# Patient Record
Sex: MALE | Race: OTHER | HISPANIC OR LATINO | URBAN - METROPOLITAN AREA
[De-identification: names, ages, dates, MRNs, and addresses within clinical notes are randomized per-mention and may not be internally consistent; named-entity substitution may affect disease eponyms.]

---

## 2023-08-15 ENCOUNTER — INPATIENT (INPATIENT)
Facility: HOSPITAL | Age: 28
LOS: 0 days | Discharge: HOME | DRG: 563 | End: 2023-08-16
Attending: STUDENT IN AN ORGANIZED HEALTH CARE EDUCATION/TRAINING PROGRAM | Admitting: INTERNAL MEDICINE
Payer: COMMERCIAL

## 2023-08-15 VITALS
SYSTOLIC BLOOD PRESSURE: 121 MMHG | DIASTOLIC BLOOD PRESSURE: 76 MMHG | HEIGHT: 71 IN | WEIGHT: 145.06 LBS | TEMPERATURE: 99 F | RESPIRATION RATE: 18 BRPM | OXYGEN SATURATION: 97 % | HEART RATE: 88 BPM

## 2023-08-15 DIAGNOSIS — Z90.49 ACQUIRED ABSENCE OF OTHER SPECIFIED PARTS OF DIGESTIVE TRACT: Chronic | ICD-10-CM

## 2023-08-15 LAB
ANION GAP SERPL CALC-SCNC: 7 MMOL/L — SIGNIFICANT CHANGE UP (ref 5–17)
BASOPHILS # BLD AUTO: 0.01 K/UL — SIGNIFICANT CHANGE UP (ref 0–0.2)
BASOPHILS NFR BLD AUTO: 0.1 % — SIGNIFICANT CHANGE UP (ref 0–2)
BUN SERPL-MCNC: 13 MG/DL — SIGNIFICANT CHANGE UP (ref 7–23)
CALCIUM SERPL-MCNC: 9.7 MG/DL — SIGNIFICANT CHANGE UP (ref 8.4–10.5)
CHLORIDE SERPL-SCNC: 100 MMOL/L — SIGNIFICANT CHANGE UP (ref 96–108)
CO2 SERPL-SCNC: 30 MMOL/L — SIGNIFICANT CHANGE UP (ref 22–31)
CREAT SERPL-MCNC: 1.03 MG/DL — SIGNIFICANT CHANGE UP (ref 0.5–1.3)
EGFR: 101 ML/MIN/1.73M2 — SIGNIFICANT CHANGE UP
EOSINOPHIL # BLD AUTO: 0.02 K/UL — SIGNIFICANT CHANGE UP (ref 0–0.5)
EOSINOPHIL NFR BLD AUTO: 0.2 % — SIGNIFICANT CHANGE UP (ref 0–6)
GLUCOSE SERPL-MCNC: 164 MG/DL — HIGH (ref 70–99)
HCT VFR BLD CALC: 41.1 % — SIGNIFICANT CHANGE UP (ref 39–50)
HGB BLD-MCNC: 15 G/DL — SIGNIFICANT CHANGE UP (ref 13–17)
IMM GRANULOCYTES NFR BLD AUTO: 0.2 % — SIGNIFICANT CHANGE UP (ref 0–0.9)
LYMPHOCYTES # BLD AUTO: 2.84 K/UL — SIGNIFICANT CHANGE UP (ref 1–3.3)
LYMPHOCYTES # BLD AUTO: 30.3 % — SIGNIFICANT CHANGE UP (ref 13–44)
MCHC RBC-ENTMCNC: 28.8 PG — SIGNIFICANT CHANGE UP (ref 27–34)
MCHC RBC-ENTMCNC: 36.5 GM/DL — HIGH (ref 32–36)
MCV RBC AUTO: 78.9 FL — LOW (ref 80–100)
MONOCYTES # BLD AUTO: 0.54 K/UL — SIGNIFICANT CHANGE UP (ref 0–0.9)
MONOCYTES NFR BLD AUTO: 5.8 % — SIGNIFICANT CHANGE UP (ref 2–14)
NEUTROPHILS # BLD AUTO: 5.94 K/UL — SIGNIFICANT CHANGE UP (ref 1.8–7.4)
NEUTROPHILS NFR BLD AUTO: 63.4 % — SIGNIFICANT CHANGE UP (ref 43–77)
NRBC # BLD: 0 /100 WBCS — SIGNIFICANT CHANGE UP (ref 0–0)
PLATELET # BLD AUTO: 241 K/UL — SIGNIFICANT CHANGE UP (ref 150–400)
POTASSIUM SERPL-MCNC: 3.8 MMOL/L — SIGNIFICANT CHANGE UP (ref 3.5–5.3)
POTASSIUM SERPL-SCNC: 3.8 MMOL/L — SIGNIFICANT CHANGE UP (ref 3.5–5.3)
RBC # BLD: 5.21 M/UL — SIGNIFICANT CHANGE UP (ref 4.2–5.8)
RBC # FLD: 12.4 % — SIGNIFICANT CHANGE UP (ref 10.3–14.5)
SODIUM SERPL-SCNC: 137 MMOL/L — SIGNIFICANT CHANGE UP (ref 135–145)
WBC # BLD: 9.37 K/UL — SIGNIFICANT CHANGE UP (ref 3.8–10.5)
WBC # FLD AUTO: 9.37 K/UL — SIGNIFICANT CHANGE UP (ref 3.8–10.5)

## 2023-08-15 PROCEDURE — 99223 1ST HOSP IP/OBS HIGH 75: CPT | Mod: GC

## 2023-08-15 PROCEDURE — 72100 X-RAY EXAM L-S SPINE 2/3 VWS: CPT | Mod: 26

## 2023-08-15 PROCEDURE — 99285 EMERGENCY DEPT VISIT HI MDM: CPT

## 2023-08-15 PROCEDURE — 72170 X-RAY EXAM OF PELVIS: CPT | Mod: 26

## 2023-08-15 RX ORDER — LIDOCAINE 4 G/100G
1 CREAM TOPICAL ONCE
Refills: 0 | Status: COMPLETED | OUTPATIENT
Start: 2023-08-15 | End: 2023-08-15

## 2023-08-15 RX ORDER — OXYCODONE HYDROCHLORIDE 5 MG/1
5 TABLET ORAL ONCE
Refills: 0 | Status: DISCONTINUED | OUTPATIENT
Start: 2023-08-15 | End: 2023-08-15

## 2023-08-15 RX ORDER — ACETAMINOPHEN 500 MG
975 TABLET ORAL ONCE
Refills: 0 | Status: COMPLETED | OUTPATIENT
Start: 2023-08-15 | End: 2023-08-15

## 2023-08-15 RX ORDER — DIAZEPAM 5 MG
5 TABLET ORAL ONCE
Refills: 0 | Status: DISCONTINUED | OUTPATIENT
Start: 2023-08-15 | End: 2023-08-15

## 2023-08-15 RX ORDER — ACETAMINOPHEN 500 MG
650 TABLET ORAL EVERY 6 HOURS
Refills: 0 | Status: DISCONTINUED | OUTPATIENT
Start: 2023-08-15 | End: 2023-08-16

## 2023-08-15 RX ORDER — KETOROLAC TROMETHAMINE 30 MG/ML
30 SYRINGE (ML) INJECTION ONCE
Refills: 0 | Status: DISCONTINUED | OUTPATIENT
Start: 2023-08-15 | End: 2023-08-15

## 2023-08-15 RX ORDER — ONDANSETRON 8 MG/1
4 TABLET, FILM COATED ORAL EVERY 8 HOURS
Refills: 0 | Status: DISCONTINUED | OUTPATIENT
Start: 2023-08-15 | End: 2023-08-16

## 2023-08-15 RX ORDER — LANOLIN ALCOHOL/MO/W.PET/CERES
3 CREAM (GRAM) TOPICAL AT BEDTIME
Refills: 0 | Status: DISCONTINUED | OUTPATIENT
Start: 2023-08-15 | End: 2023-08-16

## 2023-08-15 RX ADMIN — OXYCODONE HYDROCHLORIDE 5 MILLIGRAM(S): 5 TABLET ORAL at 18:55

## 2023-08-15 RX ADMIN — LIDOCAINE 1 PATCH: 4 CREAM TOPICAL at 19:42

## 2023-08-15 RX ADMIN — Medication 5 MILLIGRAM(S): at 15:49

## 2023-08-15 RX ADMIN — Medication 975 MILLIGRAM(S): at 15:49

## 2023-08-15 RX ADMIN — LIDOCAINE 1 PATCH: 4 CREAM TOPICAL at 15:48

## 2023-08-15 RX ADMIN — Medication 975 MILLIGRAM(S): at 16:49

## 2023-08-15 RX ADMIN — Medication 30 MILLIGRAM(S): at 15:50

## 2023-08-15 RX ADMIN — OXYCODONE HYDROCHLORIDE 5 MILLIGRAM(S): 5 TABLET ORAL at 19:55

## 2023-08-15 RX ADMIN — LIDOCAINE 1 PATCH: 4 CREAM TOPICAL at 18:55

## 2023-08-15 RX ADMIN — Medication 30 MILLIGRAM(S): at 16:20

## 2023-08-15 RX ADMIN — LIDOCAINE 1 PATCH: 4 CREAM TOPICAL at 19:19

## 2023-08-15 NOTE — ED PROVIDER NOTE - PHYSICAL EXAMINATION
Gen - Nontoxic appearing, follows all commands, comfortable at rest; A+Ox3   HEENT - NCAT, EOMI, moist mucous membranes, clear oropharynx  Neck - supple  Resp - CTAB, no increased WOB  CV -  RRR, no m/r/g  Abd - soft, NT, ND; no guarding or rebound  Back - no midline tenderness/crepitus/stepoffs; +paraspinal lumbar region tenderness and pain reproduced with back rotation/flexion  MSK - FROM of b/l UE and LE, no gross deformities  Extrem - no LE edema/erythema/tenderness  Neuro - full motor strength and sensation to LT throughout  Skin - warm, well perfused

## 2023-08-15 NOTE — ED PROVIDER NOTE - PROGRESS NOTE DETAILS
Jelani Conley MD: Patient reassessed multiple times--despite multimodal pain control as well as oxycodone dose, patient having significant pain to lower back, is able to bear weight and walk small distances slowly but is very apprehensive towards ambulation and does not feel safe going home. Patient was able to urinate in bathroom without issues, continues to be neurologically intact on exam, and has very much muscular tenderness/pain with movement to paraspinal lumbar back. Will at this point admit patient for pain control and PT/OT eval. L-spine XR read by radiology, negative for acute fracture/dislocation, do not feel patient would benefit from further imaging.

## 2023-08-15 NOTE — H&P ADULT - ATTENDING COMMENTS
#Lower back pain: p/w acute back pain during gym work out; +tender to palpation to left lumbar area, neg straight leg test, MS/Sensation intact. Low suspicion for spinal etiology. Likley muscle strain related. c/w pain control, flexiril prn, consider further imaging if not improved

## 2023-08-15 NOTE — H&P ADULT - PROBLEM SELECTOR PLAN 1
P/w low back pain s/p gym injury. Was on his back raising both legs, when he felt a sudden pull in his lower back region that is now severely painful especially with movement, having trouble walking due to pain. No alarm signs. XR lumbar unremarkable. s/p Tylenol 975mg, Toradol 30mg, Oxycodone 5mg, Valium 5mg & 2x Lidocaine patch 4%.  - Reevaluate pain in AM, if worsening consider additional imaging (CT/MRI spine)  - Pain regimen with PRN PO Tylenol 650mg, Oxycodone 5/10mg q6h for mild, mod, severe pain  - PT eval P/w low back pain s/p gym injury. Was on his back raising both legs, when he felt a sudden pull in his lower back region that is now severely painful especially with movement, having trouble walking due to pain. No alarm signs. XR lumbar unremarkable. s/p Tylenol 975mg, Toradol 30mg, Oxycodone 5mg, Valium 5mg & 2x Lidocaine patch 4%.  - Reevaluate pain in AM, if worsening consider additional imaging (CT/MRI spine)  - Pain regimen with PRN PO Tylenol 650mg, Cyclobenzaprine 5mg q8h, Oxycodone 5mg q6h for mild, mod, severe pain  - PT eval

## 2023-08-15 NOTE — H&P ADULT - HISTORY OF PRESENT ILLNESS
28M hx of appendectomy, presenting with low back pain s/p gym injury. States he was working out at the gym, was on his back raising both legs, when he felt a sudden pull in his lower back region that is now severely painful especially with movement, having trouble walking due to pain. Denies any other form of trauma/fall. No numbness, focal weakness, saddle anesthesia, urinary/fecal incontinence. Feels like previous similar episode of muscular strain that he sustained while also lifting at the gym. NKDA.    In the ED, VSS, labs largely pristine. XR lumbar unremarkable. He received Po Tylenol 975mg, Toradol 30mg, Oxycodone 5mg, Valium 5mg & 2x Lidocaine patch 4%.

## 2023-08-15 NOTE — ED ADULT NURSE NOTE - CHIEF COMPLAINT QUOTE
pt bibems from [employer] c/o "pulling a muscle in the back". Pt states he had a similar injury recently and lifting at the gym today made the pain worse. Pt states the pain is worse with position change. Denies any numbness weakness, incontinence, SOB, or CP.

## 2023-08-15 NOTE — H&P ADULT - NSHPLABSRESULTS_GEN_ALL_CORE
LABS:                         15.0   9.37  )-----------( 241      ( 15 Aug 2023 20:39 )             41.1     08-15    137  |  100  |  13  ----------------------------<  164<H>  3.8   |  30  |  1.03    Ca    9.7      15 Aug 2023 20:39    Urinalysis Basic - ( 15 Aug 2023 20:39 )    Color: x / Appearance: x / SG: x / pH: x  Gluc: 164 mg/dL / Ketone: x  / Bili: x / Urobili: x   Blood: x / Protein: x / Nitrite: x   Leuk Esterase: x / RBC: x / WBC x   Sq Epi: x / Non Sq Epi: x / Bacteria: x    RADIOLOGY, EKG & ADDITIONAL TESTS:

## 2023-08-15 NOTE — ED PROVIDER NOTE - OBJECTIVE STATEMENT
28 year old male, hx of appendectomy, presenting with low back pain s/p gym injury. States he was working out at the gym, was on his back raising both legs, when he felt a sudden pull in his lower back region that is now severely painful especially with movement, having trouble walking due to pain. Denies any other form of trauma/fall. No numbness, focal weakness, saddle anesthesia, urinary/fecal incontinence. Feels like previous similar episode of muscular strain that he sustained while also lifting at the gym. NKDA.

## 2023-08-15 NOTE — H&P ADULT - NSHPPHYSICALEXAM_GEN_ALL_CORE
.  VITAL SIGNS:  T(C): 36.6 (08-15-23 @ 23:35), Max: 37.1 (08-15-23 @ 15:02)  T(F): 97.9 (08-15-23 @ 23:35), Max: 98.8 (08-15-23 @ 15:02)  HR: 71 (08-15-23 @ 23:35) (71 - 88)  BP: 138/72 (08-15-23 @ 23:35) (112/67 - 138/72)  BP(mean): 94 (08-15-23 @ 23:35) (94 - 94)  RR: 17 (08-15-23 @ 23:35) (16 - 18)  SpO2: 95% (08-15-23 @ 23:35) (95% - 99%)  Wt(kg): --    PHYSICAL EXAM:    Constitutional: WDWN resting comfortably in bed; NAD  Head: NC/AT  Eyes: PERRL, EOMI, clear conjunctiva  ENT: no nasal discharge; uvula midline, no oropharyngeal erythema or exudates; MMM  Neck: supple; no JVD or thyromegaly  Respiratory: CTA B/L; no W/R/R, no retractions  Cardiac: +S1/S2; RRR; no M/R/G;  Gastrointestinal: soft, NT/ND; no rebound or guarding; +BSx4  Extremities: WWP, no clubbing or cyanosis; no peripheral edema  Musculoskeletal: NROM x4; no joint swelling, tenderness or erythema. L hip not TTP. Straight leg test L more painful than right.  Vascular: 2+ radial, femoral, DP/PT pulses B/L  Dermatologic: skin warm, dry and intact; no rashes, wounds, or scars  Neurologic: AAOx3; CNII-XII grossly intact; no focal deficits  Psychiatric: affect and characteristics of appearance, verbalizations, behaviors are appropriate VITAL SIGNS:  T(C): 36.6 (08-15-23 @ 23:35), Max: 37.1 (08-15-23 @ 15:02)  T(F): 97.9 (08-15-23 @ 23:35), Max: 98.8 (08-15-23 @ 15:02)  HR: 71 (08-15-23 @ 23:35) (71 - 88)  BP: 138/72 (08-15-23 @ 23:35) (112/67 - 138/72)  BP(mean): 94 (08-15-23 @ 23:35) (94 - 94)  RR: 17 (08-15-23 @ 23:35) (16 - 18)  SpO2: 95% (08-15-23 @ 23:35) (95% - 99%)  Wt(kg): --    PHYSICAL EXAM:  Constitutional: resting comfortably in bed; NAD  Head: NC/AT  Eyes: PERRL, EOMI, clear conjunctiva  ENT: no nasal discharge; uvula midline, no oropharyngeal erythema or exudates; MMM  Neck: supple; no JVD or thyromegaly  Respiratory: CTA B/L; no W/R/R, no retractions  Cardiac: +S1/S2; RRR; no M/R/G;  Gastrointestinal: soft, NT/ND; no rebound or guarding; +BSx4  Extremities: WWP, no clubbing or cyanosis; no peripheral edema  Musculoskeletal: NROM x4; no joint swelling, tenderness or erythema. L hip not TTP. Negative straight leg test. mild L gluteal pain on hip flexion  Vascular: 2+ radial, femoral, DP/PT pulses B/L  Dermatologic: skin warm, dry and intact; no rashes, wounds, or scars  Neurologic: AAOx3; CNII-XII grossly intact; no focal deficits  Psychiatric: affect and characteristics of appearance, verbalizations, behaviors are appropriate

## 2023-08-15 NOTE — ED PROVIDER NOTE - CLINICAL SUMMARY MEDICAL DECISION MAKING FREE TEXT BOX
28 year old male, hx of appendectomy, presenting with low back pain s/p gym injury. Nontoxic here, neurologically intact, no red flags for acute cord compression, overall low mechanism of injury. Very likely muscular strain vs spasm, no bony tenderness on exam, but per shared decision making, will obtain XR pelvis/L spine to r/o acute bony fracture. Likely DC with symptomatic treatment if improved and able to ambulate.

## 2023-08-15 NOTE — H&P ADULT - PROBLEM SELECTOR PLAN 2
F: None   E: Replete as necessary K>4 Mg>2  N: Regular diet     DVT Prophylaxis: None  GI prophylaxis: None   CODE STATUS: FULL

## 2023-08-15 NOTE — ED ADULT TRIAGE NOTE - CHIEF COMPLAINT QUOTE
pt bibems from "[employer] c/o "pulling a muscle in the back". Pt states he had a similar injury recently and lifting at the gym today made the pain worse. Pt states the pain is worse with position change. Denies any numbness weakness, incontinence, SOB, or CP.

## 2023-08-16 VITALS
SYSTOLIC BLOOD PRESSURE: 127 MMHG | HEART RATE: 70 BPM | OXYGEN SATURATION: 96 % | RESPIRATION RATE: 17 BRPM | TEMPERATURE: 99 F | DIASTOLIC BLOOD PRESSURE: 69 MMHG

## 2023-08-16 DIAGNOSIS — M54.9 DORSALGIA, UNSPECIFIED: ICD-10-CM

## 2023-08-16 DIAGNOSIS — Z00.00 ENCOUNTER FOR GENERAL ADULT MEDICAL EXAMINATION WITHOUT ABNORMAL FINDINGS: ICD-10-CM

## 2023-08-16 PROCEDURE — 99285 EMERGENCY DEPT VISIT HI MDM: CPT

## 2023-08-16 PROCEDURE — 36415 COLL VENOUS BLD VENIPUNCTURE: CPT

## 2023-08-16 PROCEDURE — 80048 BASIC METABOLIC PNL TOTAL CA: CPT

## 2023-08-16 PROCEDURE — G0378: CPT

## 2023-08-16 PROCEDURE — 85025 COMPLETE CBC W/AUTO DIFF WBC: CPT

## 2023-08-16 PROCEDURE — 99239 HOSP IP/OBS DSCHRG MGMT >30: CPT | Mod: GC

## 2023-08-16 PROCEDURE — 97161 PT EVAL LOW COMPLEX 20 MIN: CPT

## 2023-08-16 PROCEDURE — 72100 X-RAY EXAM L-S SPINE 2/3 VWS: CPT

## 2023-08-16 PROCEDURE — 72170 X-RAY EXAM OF PELVIS: CPT

## 2023-08-16 PROCEDURE — 96372 THER/PROPH/DIAG INJ SC/IM: CPT

## 2023-08-16 RX ORDER — CYCLOBENZAPRINE HYDROCHLORIDE 10 MG/1
1 TABLET, FILM COATED ORAL
Qty: 10 | Refills: 0
Start: 2023-08-16

## 2023-08-16 RX ORDER — CYCLOBENZAPRINE HYDROCHLORIDE 10 MG/1
1 TABLET, FILM COATED ORAL
Qty: 30 | Refills: 0
Start: 2023-08-16 | End: 2023-08-25

## 2023-08-16 RX ORDER — OXYCODONE HYDROCHLORIDE 5 MG/1
5 TABLET ORAL EVERY 6 HOURS
Refills: 0 | Status: DISCONTINUED | OUTPATIENT
Start: 2023-08-16 | End: 2023-08-16

## 2023-08-16 RX ORDER — IBUPROFEN 200 MG
1 TABLET ORAL
Qty: 20 | Refills: 0
Start: 2023-08-16 | End: 2023-08-20

## 2023-08-16 RX ORDER — OXYCODONE HYDROCHLORIDE 5 MG/1
10 TABLET ORAL EVERY 6 HOURS
Refills: 0 | Status: DISCONTINUED | OUTPATIENT
Start: 2023-08-16 | End: 2023-08-16

## 2023-08-16 RX ORDER — CYCLOBENZAPRINE HYDROCHLORIDE 10 MG/1
5 TABLET, FILM COATED ORAL EVERY 8 HOURS
Refills: 0 | Status: DISCONTINUED | OUTPATIENT
Start: 2023-08-16 | End: 2023-08-16

## 2023-08-16 RX ORDER — CYCLOBENZAPRINE HYDROCHLORIDE 10 MG/1
5 TABLET, FILM COATED ORAL THREE TIMES A DAY
Refills: 0 | Status: DISCONTINUED | OUTPATIENT
Start: 2023-08-16 | End: 2023-08-16

## 2023-08-16 RX ORDER — ACETAMINOPHEN 500 MG
2 TABLET ORAL
Qty: 40 | Refills: 0
Start: 2023-08-16 | End: 2023-08-20

## 2023-08-16 RX ORDER — LIDOCAINE 4 G/100G
1 CREAM TOPICAL ONCE
Refills: 0 | Status: COMPLETED | OUTPATIENT
Start: 2023-08-16 | End: 2023-08-16

## 2023-08-16 RX ADMIN — LIDOCAINE 1 PATCH: 4 CREAM TOPICAL at 03:17

## 2023-08-16 RX ADMIN — Medication 650 MILLIGRAM(S): at 13:55

## 2023-08-16 RX ADMIN — OXYCODONE HYDROCHLORIDE 10 MILLIGRAM(S): 5 TABLET ORAL at 04:55

## 2023-08-16 RX ADMIN — OXYCODONE HYDROCHLORIDE 10 MILLIGRAM(S): 5 TABLET ORAL at 03:55

## 2023-08-16 RX ADMIN — Medication 650 MILLIGRAM(S): at 13:04

## 2023-08-16 RX ADMIN — LIDOCAINE 1 PATCH: 4 CREAM TOPICAL at 07:41

## 2023-08-16 NOTE — PROGRESS NOTE ADULT - PROBLEM SELECTOR PLAN 1
P/w low back pain s/p gym injury. Was on his back raising both legs, when he felt a sudden pull in his lower back region that is now severely painful especially with movement, having trouble walking due to pain. No alarm signs. XR lumbar unremarkable. s/p Tylenol 975mg, Toradol 30mg, Oxycodone 5mg, Valium 5mg & 2x Lidocaine patch 4%.  - Reevaluate pain in AM, if worsening consider additional imaging (CT/MRI spine)  - Pain regimen with PRN PO Tylenol 650mg, Cyclobenzaprine 5mg q8h, Oxycodone 5mg q6h for mild, mod, severe pain  - PT eval P/w low back pain s/p gym injury. Was on his back raising both legs, when he felt a sudden pull in his lower back region that is now severely painful especially with movement, having trouble walking due to pain. No alarm signs. XR lumbar unremarkable. s/p Tylenol 975mg, Toradol 30mg, Oxycodone 5mg, Valium 5mg & 2x Lidocaine patch 4%.  - Reevaluate pain in AM, if worsening consider additional imaging (CT/MRI spine) -- patient pain improved AM ,no additional imaging  - Pain regimen with PRN PO Tylenol 650mg, lidocaine patch, Cyclobenzaprine 5mg q8h for mild, mod, severe pain  -d/c PRN oxycodone  - PT eval

## 2023-08-16 NOTE — PROGRESS NOTE ADULT - SUBJECTIVE AND OBJECTIVE BOX
***INCOMPLETE*** ***INCOMPLETE***    29yo man with no significant medical history presenting to the ED with severe lower back pain after gym injury. Yesterday while working out he felt a pull in his back, worse with movement and limiting his ability to walk.    ED course: Patient found to have paraspinal tenderness to palpation and movement. Pain persisted through multimodal therapy: given acetaminophen, ketorolac, lidocaine patch, oxycodone, valium. Lumbar spine x-ray negative for fracture or dislocation. Patient able to tolerate weight and ambulate with difficulty, stated that does not feel safe to go home.    Overnight:  Last PRN oxycodone 10mg at 4am. Otherwise no events.  Interval/ROS: Patient seen at bedside, states "I feel okay to go home." Patient endorses nausea without vomiting after drinking water, lumbar back pain with movement 6/10 wrapping around abdomen to front bilaterally, mild dizziness with ambulation. He denies headache, fever, chills, SOB, chest pain, abdominal pain, dysuria, weakness, myalgias, constipation/diarrhea, saddle anesthesia, incontinence, paresthesias.    OBJECTIVE:    Vital Signs Last 24 Hrs  T(C): 36.7 (16 Aug 2023 06:22), Max: 37.1 (15 Aug 2023 15:02)  T(F): 98.1 (16 Aug 2023 06:22), Max: 98.8 (15 Aug 2023 15:02)  HR: 67 (16 Aug 2023 06:22) (67 - 88)  BP: 122/64 (16 Aug 2023 06:22) (112/67 - 138/72)  BP(mean): 94 (15 Aug 2023 23:35) (94 - 94)  RR: 18 (16 Aug 2023 06:22) (16 - 18)  SpO2: 97% (16 Aug 2023 06:22) (95% - 99%)    Parameters below as of 16 Aug 2023 06:22  Patient On (Oxygen Delivery Method): room air      CONSTITUTIONAL: No apparent distress, resting comfortably  EYES: Pupils symmetric, EOMI, No conjunctival or scleral injection, non-icteric  ENMT: Oral mucosa with moist membranes  RESP: No respiratory distress, no use of accessory muscles; CTA b/l, no crackles or wheezes  CV: RRR, +S1S2, no murmurs appreciated; no peripheral edema  GI: NT, Soft, ND, no rebound, no guarding  MSK: No pain elicited to palpation of spine or paraspinal region. Gait with narrow base, small steps, effortful, able to ambulate to bathroom without assistance. Hip flex against resistance b/l elicited mild pain. Leg extension elicited no pain.  : deferred  SKIN: No rashes or ulcers noted  NEURO: CN II-XII grossly intact  PSYCH: Appropriate insight/judgment; A+O x 3, mood and affect appropriate, recent/remote memory intact        Allergies    No Known Allergies    Intolerances        MEDICATIONS  (STANDING): none    MEDICATIONS  (PRN):  acetaminophen     Tablet .. 650 milliGRAM(s) Oral every 6 hours PRN Temp greater or equal to 38C (100.4F), Mild Pain (1 - 3)  aluminum hydroxide/magnesium hydroxide/simethicone Suspension 30 milliLiter(s) Oral every 4 hours PRN Dyspepsia  cyclobenzaprine 5 milliGRAM(s) Oral three times a day PRN Muscle Spasm  melatonin 3 milliGRAM(s) Oral at bedtime PRN Insomnia  ondansetron Injectable 4 milliGRAM(s) IV Push every 8 hours PRN Nausea and/or Vomiting  oxyCODONE    IR 5 milliGRAM(s) Oral every 6 hours PRN Severe Pain (7 - 10)                                       15.0   9.37  )-----------( 241      ( 15 Aug 2023 20:39 )             41.1         08-15    137  |  100  |  13  ----------------------------<  164<H>  3.8   |  30  |  1.03    Ca    9.7      15 Aug 2023 20:39          Urinalysis Basic - ( 15 Aug 2023 20:39 )    Color: x / Appearance: x / SG: x / pH: x  Gluc: 164 mg/dL / Ketone: x  / Bili: x / Urobili: x   Blood: x / Protein: x / Nitrite: x   Leuk Esterase: x / RBC: x / WBC x   Sq Epi: x / Non Sq Epi: x / Bacteria: x   Internal Medicine Progress Note    29yo man with no significant medical history presenting to the ED with severe lower back pain after gym injury. Yesterday while working out he felt a pull in his back, worse with movement and limiting his ability to walk.    ED course: Patient found to have paraspinal tenderness to palpation and movement. Pain persisted through multimodal therapy: given acetaminophen, ketorolac, lidocaine patch, oxycodone, valium. Lumbar spine x-ray negative for fracture or dislocation. Patient able to tolerate weight and ambulate with difficulty, stated that does not feel safe to go home.    Overnight:  Last PRN oxycodone 10mg at 4am. Otherwise no events.  Interval/ROS: Patient seen at bedside, states "I feel okay to go home." Patient endorses nausea without vomiting after drinking water, lumbar back pain with movement 6/10 wrapping around abdomen to front bilaterally, mild dizziness with ambulation. He denies headache, fever, chills, SOB, chest pain, abdominal pain, dysuria, weakness, myalgias, constipation/diarrhea, saddle anesthesia, incontinence, paresthesias.    OBJECTIVE:    Vital Signs Last 24 Hrs  T(C): 36.7 (16 Aug 2023 06:22), Max: 37.1 (15 Aug 2023 15:02)  T(F): 98.1 (16 Aug 2023 06:22), Max: 98.8 (15 Aug 2023 15:02)  HR: 67 (16 Aug 2023 06:22) (67 - 88)  BP: 122/64 (16 Aug 2023 06:22) (112/67 - 138/72)  BP(mean): 94 (15 Aug 2023 23:35) (94 - 94)  RR: 18 (16 Aug 2023 06:22) (16 - 18)  SpO2: 97% (16 Aug 2023 06:22) (95% - 99%)    Parameters below as of 16 Aug 2023 06:22  Patient On (Oxygen Delivery Method): room air      CONSTITUTIONAL: No apparent distress, resting comfortably  EYES: Pupils symmetric, EOMI, No conjunctival or scleral injection, non-icteric  ENMT: Oral mucosa with moist membranes  RESP: No respiratory distress, no use of accessory muscles; CTA b/l, no crackles or wheezes  CV: RRR, +S1S2, no murmurs appreciated; no peripheral edema  GI: NT, Soft, ND, no rebound, no guarding  MSK: No pain elicited to palpation of spine or paraspinal region. Gait with narrow base, small steps, effortful, able to ambulate to bathroom without assistance. Hip flex against resistance b/l elicited mild pain. Leg extension elicited no pain.  : deferred  SKIN: No rashes or ulcers noted  NEURO: CN II-XII grossly intact  PSYCH: Appropriate insight/judgment; A+O x 3, mood and affect appropriate, recent/remote memory intact        Allergies    No Known Allergies    Intolerances        MEDICATIONS  (STANDING): none    MEDICATIONS  (PRN):  acetaminophen     Tablet .. 650 milliGRAM(s) Oral every 6 hours PRN Temp greater or equal to 38C (100.4F), Mild Pain (1 - 3)  aluminum hydroxide/magnesium hydroxide/simethicone Suspension 30 milliLiter(s) Oral every 4 hours PRN Dyspepsia  cyclobenzaprine 5 milliGRAM(s) Oral three times a day PRN Muscle Spasm  melatonin 3 milliGRAM(s) Oral at bedtime PRN Insomnia  ondansetron Injectable 4 milliGRAM(s) IV Push every 8 hours PRN Nausea and/or Vomiting  oxyCODONE    IR 5 milliGRAM(s) Oral every 6 hours PRN Severe Pain (7 - 10)                                       15.0   9.37  )-----------( 241      ( 15 Aug 2023 20:39 )             41.1         08-15    137  |  100  |  13  ----------------------------<  164<H>  3.8   |  30  |  1.03    Ca    9.7      15 Aug 2023 20:39          Urinalysis Basic - ( 15 Aug 2023 20:39 )    Color: x / Appearance: x / SG: x / pH: x  Gluc: 164 mg/dL / Ketone: x  / Bili: x / Urobili: x   Blood: x / Protein: x / Nitrite: x   Leuk Esterase: x / RBC: x / WBC x   Sq Epi: x / Non Sq Epi: x / Bacteria: x

## 2023-08-16 NOTE — DISCHARGE NOTE PROVIDER - NSDCCPCAREPLAN_GEN_ALL_CORE_FT
PRINCIPAL DISCHARGE DIAGNOSIS  Diagnosis: Muscle strain  Assessment and Plan of Treatment: Low back strain is an injury to your lower back muscles or tendons. Tendons are strong tissues that connect muscles to bones. The lower back supports most of your body weight and helps you move, twist, and bend.  To help your back heal:  1. Rest as directed. You may need to rest in bed for a period of time after your injury. Do not lift heavy objects.  2. Apply ice on your back for 15 to 20 minutes every hour or as directed. Use an ice pack, or put crushed ice in a plastic bag. Cover it with a towel. Ice helps prevent tissue damage and decreases swelling and pain.  3. Apply heat on your lower back for 20 to 30 minutes every 2 hours for as many days as directed. Heat helps decrease pain and muscle spasms.  4. Slowly start to increase your activity as the pain decreases, or as directed. Follow how your body feels.  A follow-up appointment with a primary care provider has been scheduled. Please seek additional medical care if pain continues or worsens or your legs feel numb.     PRINCIPAL DISCHARGE DIAGNOSIS  Diagnosis: Muscle strain  Assessment and Plan of Treatment: Low back strain is an injury to your lower back muscles or tendons. Tendons are strong tissues that connect muscles to bones. The lower back supports most of your body weight and helps you move, twist, and bend.  To help your back heal:  1. Rest as directed. You may need to rest in bed for a period of time after your injury. Do not lift heavy objects.  2. Apply ice on your back for 15 to 20 minutes every hour or as directed. Use an ice pack, or put crushed ice in a plastic bag. Cover it with a towel. Ice helps prevent tissue damage and decreases swelling and pain.  3. Apply heat on your lower back for 20 to 30 minutes every 2 hours for as many days as directed. Heat helps decrease pain and muscle spasms.  4. Slowly start to increase your activity as the pain decreases, or as directed. Follow how your body feels.  5. Take prescribed/over the counter medications only as directed to relieve pain.  A follow-up appointment with a primary care provider has been scheduled. Please seek additional medical care if pain worsens or your legs feel numb.

## 2023-08-16 NOTE — DISCHARGE NOTE PROVIDER - HOSPITAL COURSE
#Discharge: do not delete    27yo man with no significant medical history presenting to the ED with severe lower back pain after gym injury. Yesterday while working out he felt a pull in his back, worse with movement and limiting his ability to walk.    Hospital course (by problem):     Back pain  Patient found to have paraspinal tenderness to palpation and movement. Pain persisted through multimodal therapy: given Tylenol 975mg, Toradol 30mg, Oxycodone 5mg & 10mg, Valium 5mg & Lidocaine patch 4%. Lumbar spine x-ray negative for fracture or dislocation. Patient able to tolerate weight and ambulate with difficulty, stated that does not feel safe to go home 8/15.  Patient admitted to floor overnight for pain control, in AM 8/16 stated he feels somewhat improved and feels comfortable going home and treat pain outpatient.      Patient was discharged to: home    New medications: ______________  Changes to old medications: none  Medications that were stopped: ________________    Items to follow up as outpatient:  ____________________________    Physical exam at the time of discharge:  CONSTITUTIONAL: No apparent distress, resting comfortably  EYES: Pupils symmetric, EOMI, No conjunctival or scleral injection, non-icteric  ENMT: Oral mucosa with moist membranes  RESP: No respiratory distress, no use of accessory muscles; CTA b/l, no crackles or wheezes  CV: RRR, +S1S2, no murmurs appreciated; no peripheral edema  GI: NT, Soft, ND, no rebound, no guarding  MSK: No pain elicited to palpation of spine or paraspinal region. Gait with narrow base, small steps, effortful, able to ambulate to bathroom without assistance. Hip flex against resistance b/l elicited mild pain. Leg extension elicited no pain.  : deferred  SKIN: No rashes or ulcers noted  NEURO: CN II-XII grossly intact  PSYCH: Appropriate insight/judgment; A+O x 3, mood and affect appropriate, recent/remote memory intact   #Discharge: do not delete    29yo man with no significant medical history presenting to the ED with severe lower back pain after gym injury. Yesterday while working out he felt a pull in his back, worse with movement and limiting his ability to walk.    Hospital course (by problem):     Back pain  Patient found to have paraspinal tenderness to palpation and movement. Pain persisted through multimodal therapy: given Tylenol 975mg, Toradol 30mg, Oxycodone 5mg & 10mg, Valium 5mg & Lidocaine patch 4%. Lumbar spine x-ray negative for fracture or dislocation. Patient able to tolerate weight and ambulate with difficulty, stated that does not feel safe to go home 8/15.  Patient admitted to floor overnight for pain control, in AM 8/16 stated he feels somewhat improved and feels comfortable going home and treat pain outpatient.      Patient was discharged to: home    New medications: ______________  Changes to old medications: none  Medications that were stopped: none    Items to follow up as outpatient:  PCP appointment (back pain)    Physical exam at the time of discharge:  CONSTITUTIONAL: No apparent distress, resting comfortably  EYES: Pupils symmetric, EOMI, No conjunctival or scleral injection, non-icteric  ENMT: Oral mucosa with moist membranes  RESP: No respiratory distress, no use of accessory muscles; CTA b/l, no crackles or wheezes  CV: RRR, +S1S2, no murmurs appreciated; no peripheral edema  GI: NT, Soft, ND, no rebound, no guarding  MSK: No pain elicited to palpation of spine or paraspinal region. Gait with narrow base, small steps, effortful, able to ambulate to bathroom without assistance. Hip flex against resistance b/l elicited mild pain. Leg extension elicited no pain.  : deferred  SKIN: No rashes or ulcers noted  NEURO: CN II-XII grossly intact  PSYCH: Appropriate insight/judgment; A+O x 3, mood and affect appropriate, recent/remote memory intact   #Discharge: do not delete    29yo man with no significant medical history presenting to the ED with severe lower back pain after gym injury. Yesterday while working out he felt a pull in his back, worse with movement and limiting his ability to walk.    Hospital course (by problem):     Back pain  Patient found to have paraspinal tenderness to palpation and movement. Pain persisted through multimodal therapy: given Tylenol 975mg, Toradol 30mg, Oxycodone 5mg & 10mg, Valium 5mg & Lidocaine patch 4%. Lumbar spine x-ray negative for fracture or dislocation. Patient able to tolerate weight and ambulate with difficulty, stated that does not feel safe to go home 8/15.  Patient admitted to floor overnight for pain control, in AM 8/16 stated he feels somewhat improved and feels comfortable going home and treat pain outpatient.      Patient was discharged to: home    New medications: ______________  Changes to old medications: none  Medications that were stopped: none    Items to follow up as outpatient:  PCP appointment (back pain)    Physical exam at the time of discharge:  CONSTITUTIONAL: No apparent distress, resting comfortably  EYES: Pupils symmetric, EOMI, No conjunctival or scleral injection, non-icteric  ENMT: Oral mucosa with moist membranes  RESP: No respiratory distress, no use of accessory muscles; CTA b/l, no crackles or wheezes  CV: RRR, +S1S2, no murmurs appreciated; no peripheral edema  GI: NT, Soft, ND, no rebound, no guarding  MSK: No pain elicited to palpation of spine or paraspinal region. Gait with narrow base, small steps, effortful, able to ambulate to bathroom without assistance. Hip flex against resistance b/l elicited mild pain. Straight leg raise elicited no pain.  : deferred  SKIN: No rashes or ulcers noted  NEURO: CN II-XII grossly intact  PSYCH: Appropriate insight/judgment; A+O x 3, mood and affect appropriate, recent/remote memory intact   #Discharge: do not delete    27yo man with no significant medical history presenting to the ED with severe lower back pain after gym injury. Yesterday while working out he felt a pull in his back, worse with movement and limiting his ability to walk.    Hospital course (by problem):     Back pain  Patient found to have paraspinal tenderness to palpation and movement. Pain persisted through multimodal therapy: given Tylenol 975mg, Toradol 30mg, Oxycodone 5mg & 10mg, Valium 5mg & Lidocaine patch 4%. Lumbar spine x-ray negative for fracture or dislocation. Patient able to tolerate weight and ambulate with difficulty, stated that does not feel safe to go home 8/15.  Patient admitted to floor overnight for pain control, in AM 8/16 stated he feels somewhat improved and feels comfortable going home and treat pain outpatient.      Patient was discharged to: home    New medications: ibuprofen, Tylenol  Changes to old medications: none  Medications that were stopped: none    Items to follow up as outpatient:  PCP appointment (back pain)    Physical exam at the time of discharge:  CONSTITUTIONAL: No apparent distress, resting comfortably  EYES: Pupils symmetric, EOMI, No conjunctival or scleral injection, non-icteric  ENMT: Oral mucosa with moist membranes  RESP: No respiratory distress, no use of accessory muscles; CTA b/l, no crackles or wheezes  CV: RRR, +S1S2, no murmurs appreciated; no peripheral edema  GI: NT, Soft, ND, no rebound, no guarding  MSK: No pain elicited to palpation of spine or paraspinal region. Gait with narrow base, small steps, effortful, able to ambulate to bathroom without assistance. Hip flex against resistance b/l elicited mild pain. Straight leg raise elicited no pain.  : deferred  SKIN: No rashes or ulcers noted  NEURO: CN II-XII grossly intact  PSYCH: Appropriate insight/judgment; A+O x 3, mood and affect appropriate, recent/remote memory intact   #Discharge: do not delete    29yo man with no significant medical history presenting to the ED with severe lower back pain after gym injury. Yesterday while working out he felt a pull in his back, worse with movement and limiting his ability to walk.    Hospital course (by problem):     Back pain  Patient found to have paraspinal tenderness to palpation and movement. Pain persisted through multimodal therapy: given Tylenol 975mg, Toradol 30mg, Oxycodone 5mg & 10mg, Valium 5mg & Lidocaine patch 4%. Lumbar spine x-ray negative for fracture or dislocation. Patient able to tolerate weight and ambulate with difficulty, stated that does not feel safe to go home 8/15.  Patient admitted to floor overnight for pain control, in AM 8/16 stated he feels somewhat improved and feels comfortable going home and treat pain outpatient.      Patient was discharged to: home    New medications: ibuprofen, Tylenol, flexril, lidocaine patches   Changes to old medications: none  Medications that were stopped: none    Items to follow up as outpatient:  PCP appointment (back pain)    Physical exam at the time of discharge:  CONSTITUTIONAL: No apparent distress, resting comfortably  EYES: Pupils symmetric, EOMI, No conjunctival or scleral injection, non-icteric  ENMT: Oral mucosa with moist membranes  RESP: No respiratory distress, no use of accessory muscles; CTA b/l, no crackles or wheezes  CV: RRR, +S1S2, no murmurs appreciated; no peripheral edema  GI: NT, Soft, ND, no rebound, no guarding  MSK: No pain elicited to palpation of spine or paraspinal region. Gait with narrow base, small steps, effortful, able to ambulate to bathroom without assistance. Hip flex against resistance b/l elicited mild pain. Straight leg raise elicited no pain.  : deferred  SKIN: No rashes or ulcers noted  NEURO: CN II-XII grossly intact  PSYCH: Appropriate insight/judgment; A+O x 3, mood and affect appropriate, recent/remote memory intact

## 2023-08-16 NOTE — DISCHARGE NOTE NURSING/CASE MANAGEMENT/SOCIAL WORK - NSDCPEFALRISK_GEN_ALL_CORE
For information on Fall & Injury Prevention, visit: [email or web address] OR  [email or web address] OR  https://www.cdc.gov/steadi/patient.html

## 2023-08-16 NOTE — DISCHARGE NOTE NURSING/CASE MANAGEMENT/SOCIAL WORK - PATIENT PORTAL LINK FT
You can access the FollowMyHealth Patient Portal offered by [facility] by registering at the following website: [email or web address]. By joining [facility]’s FollowMyHealth portal, you will also be able to view your health information using other applications (apps) compatible with our system.

## 2023-08-16 NOTE — PHYSICAL THERAPY INITIAL EVALUATION ADULT - ADDITIONAL COMMENTS
pt [place]/ elevators. Denies use of DME for ambulation prior to this admission. Denies hx of recent falls. Is independent in all ADLs

## 2023-08-16 NOTE — DISCHARGE NOTE PROVIDER - NSDCMRMEDTOKEN_GEN_ALL_CORE_FT
ibuprofen 400 mg oral tablet: 1 tab(s) orally every 6 hours  Tylenol 8 Hour 650 mg oral tablet, extended release: 2 tab(s) orally every 6 hours   cyclobenzaprine 5 mg oral tablet: 1 tab(s) orally 3 times a day  ibuprofen 400 mg oral tablet: 1 tab(s) orally every 6 hours  lidocaine patches: place patches on back every 12 hours for pain relief  Tylenol 8 Hour 650 mg oral tablet, extended release: 2 tab(s) orally every 6 hours   cyclobenzaprine 5 mg oral tablet: 1 tab(s) orally 3 times a day  cyclobenzaprine 5 mg oral tablet: 1 tab(s) orally 3 times a day  ibuprofen 400 mg oral tablet: 1 tab(s) orally every 6 hours  lidocaine patches: place patches on back every 12 hours for pain relief  Tylenol 8 Hour 650 mg oral tablet, extended release: 2 tab(s) orally every 6 hours

## 2023-08-21 DIAGNOSIS — S39.012A STRAIN OF MUSCLE, FASCIA AND TENDON OF LOWER BACK, INITIAL ENCOUNTER: ICD-10-CM
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
## 2023-08-21 DIAGNOSIS — M62.830 MUSCLE SPASM OF BACK: ICD-10-CM
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
## 2024-03-07 ENCOUNTER — EMERGENCY (EMERGENCY)
Facility: HOSPITAL | Age: 29
LOS: 1 days | Discharge: HOME | End: 2024-03-07
Attending: EMERGENCY MEDICINE | Admitting: EMERGENCY MEDICINE
Payer: COMMERCIAL

## 2024-03-07 VITALS
SYSTOLIC BLOOD PRESSURE: 125 MMHG | OXYGEN SATURATION: 97 % | DIASTOLIC BLOOD PRESSURE: 75 MMHG | RESPIRATION RATE: 18 BRPM | TEMPERATURE: 100 F | HEART RATE: 89 BPM

## 2024-03-07 VITALS
OXYGEN SATURATION: 96 % | HEIGHT: 71 IN | WEIGHT: 136.03 LBS | SYSTOLIC BLOOD PRESSURE: 133 MMHG | HEART RATE: 112 BPM | DIASTOLIC BLOOD PRESSURE: 81 MMHG | TEMPERATURE: 102 F | RESPIRATION RATE: 18 BRPM

## 2024-03-07 DIAGNOSIS — M25.532 PAIN IN LEFT WRIST: ICD-10-CM

## 2024-03-07 DIAGNOSIS — R05.1 ACUTE COUGH: ICD-10-CM

## 2024-03-07 DIAGNOSIS — M25.531 PAIN IN RIGHT WRIST: ICD-10-CM

## 2024-03-07 DIAGNOSIS — Z20.822 CONTACT WITH AND (SUSPECTED) EXPOSURE TO COVID-19: ICD-10-CM

## 2024-03-07 DIAGNOSIS — Z90.49 ACQUIRED ABSENCE OF OTHER SPECIFIED PARTS OF DIGESTIVE TRACT: Chronic | ICD-10-CM

## 2024-03-07 DIAGNOSIS — R10.9 UNSPECIFIED ABDOMINAL PAIN: ICD-10-CM

## 2024-03-07 DIAGNOSIS — J11.1 INFLUENZA DUE TO UNIDENTIFIED INFLUENZA VIRUS WITH OTHER RESPIRATORY MANIFESTATIONS: ICD-10-CM

## 2024-03-07 DIAGNOSIS — M25.512 PAIN IN LEFT SHOULDER: ICD-10-CM

## 2024-03-07 DIAGNOSIS — M25.511 PAIN IN RIGHT SHOULDER: ICD-10-CM

## 2024-03-07 DIAGNOSIS — R00.0 TACHYCARDIA, UNSPECIFIED: ICD-10-CM

## 2024-03-07 LAB
FLUAV AG NPH QL: SIGNIFICANT CHANGE UP
FLUBV AG NPH QL: DETECTED
RSV RNA NPH QL NAA+NON-PROBE: SIGNIFICANT CHANGE UP
SARS-COV-2 RNA SPEC QL NAA+PROBE: SIGNIFICANT CHANGE UP

## 2024-03-07 PROCEDURE — 71046 X-RAY EXAM CHEST 2 VIEWS: CPT | Mod: 26

## 2024-03-07 PROCEDURE — 87637 SARSCOV2&INF A&B&RSV AMP PRB: CPT

## 2024-03-07 PROCEDURE — 71046 X-RAY EXAM CHEST 2 VIEWS: CPT

## 2024-03-07 PROCEDURE — 99284 EMERGENCY DEPT VISIT MOD MDM: CPT

## 2024-03-07 PROCEDURE — 99283 EMERGENCY DEPT VISIT LOW MDM: CPT | Mod: 25

## 2024-03-07 RX ORDER — IBUPROFEN 200 MG
600 TABLET ORAL ONCE
Refills: 0 | Status: COMPLETED | OUTPATIENT
Start: 2024-03-07 | End: 2024-03-07

## 2024-03-07 RX ORDER — ACETAMINOPHEN 500 MG
650 TABLET ORAL ONCE
Refills: 0 | Status: COMPLETED | OUTPATIENT
Start: 2024-03-07 | End: 2024-03-07

## 2024-03-07 RX ADMIN — Medication 650 MILLIGRAM(S): at 14:09

## 2024-03-07 RX ADMIN — Medication 600 MILLIGRAM(S): at 14:09

## 2024-03-07 NOTE — ED PROVIDER NOTE - PATIENT PORTAL LINK FT
You can access the FollowMyHealth Patient Portal offered by [facility] by registering at the following website: [email or web address]. By joining [facility]’s FollowMyHealth portal, you will also be able to view your health information using other applications (apps) compatible with our system.

## 2024-03-07 NOTE — ED PROVIDER NOTE - NSFOLLOWUPINSTRUCTIONS_ED_ALL_ED_FT
Your viral results are pending. Can take a few hours to a few days to result. If you are "positive," you will be contacted. You can also call ER at [phone] to get results     Can take tylenol 650mg or motrin 600mg (May cause stomach irritation - take with food and avoid prolonged use) every 6hrs as needed for pain or fever.    Stay well hydrated.    Return for persistent fevers, persistent vomit, worsening pain, worsening breathing, worsening lightheaded.    Follow up with primary doctor within 1-2 days.     Viral Respiratory Infection    A viral respiratory infection is an illness that affects parts of the body used for breathing, like the lungs, nose, and throat. It is caused by a germ called a virus. Symptoms can include runny nose, coughing, sneezing, fatigue, body aches, sore throat, fever, or headache. Over the counter medicine can be used to manage the symptoms but the infection typically goes away on its own in 5 to 10 days.     SEEK IMMEDIATE MEDICAL CARE IF YOU HAVE ANY OF THE FOLLOWING SYMPTOMS: shortness of breath, chest pain, fever over 10 days, or lightheadedness/dizziness. You have the flu and are contagious!    Can take tylenol (Acetaminophen) 650mg or motrin (ibuprofen) 600mg (May cause stomach irritation - take with food and avoid prolonged use) every 6hrs as needed for pain or fever.    Stay well hydrated.    Return for fevers, persistent vomit, uncontrolled pain, worsening breathing, worsening lightheaded.    Follow up with primary doctor within 1-2 days.     Viral Respiratory Infection    A viral respiratory infection is an illness that affects parts of the body used for breathing, like the lungs, nose, and throat. It is caused by a germ called a virus. Symptoms can include runny nose, coughing, sneezing, fatigue, body aches, sore throat, fever, or headache. Over the counter medicine can be used to manage the symptoms but the infection typically goes away on its own in 5 to 10 days.     SEEK IMMEDIATE MEDICAL CARE IF YOU HAVE ANY OF THE FOLLOWING SYMPTOMS: shortness of breath, chest pain, fever over 10 days, or lightheadedness/dizziness.

## 2024-03-07 NOTE — ED PROVIDER NOTE - OBJECTIVE STATEMENT
Pt is a 28 M with no PMH who presented to the ED with 1 week of cough and subjective fevers that have been worsening over the past 24 hours. He states that he came back from [place] 1 week ago and started to have subjective fevers and a cough for which he took Nyquil. Over the last 24 hours he developed a runny nose and his cough has progressed to be productive of white sputum. Additionally, he had an episode of SOB overnight that has since resolved. He also endorses dry heaving and abdominal pain 2/2 coughing. He denies any nausea or vomiting. He developed joint pain in his wrists and shoulders yesterday. He states that his symptoms improved with Ibuprofen. He denies throat pain, chills, fatigue, CP, and diarrhea/constipation.

## 2024-03-07 NOTE — ED PROVIDER NOTE - CARE PLAN
1 Principal Discharge DX:	Upper respiratory infection   Principal Discharge DX:	Upper respiratory infection  Secondary Diagnosis:	Flu

## 2024-03-07 NOTE — ED PROVIDER NOTE - PROGRESS NOTE DETAILS
K[place]: CXR wnl. Vitals improved. Remains very well appearing. Discussed importance of outpt follow up and return precautions. Clinically no indication for further emergent ED workup or hospitalization at this time. Stable for dc, outpt f/u. Klepfish: flu+.

## 2024-03-07 NOTE — ED ADULT NURSE NOTE - CAS ELECT INFOMATION PROVIDED
subcostal retractions/expansion symmetric
no use of accessory muscles/no retractions/expansion symmetric
no use of accessory muscles/no retractions/expansion symmetric
DC instructions

## 2024-03-07 NOTE — ED PROVIDER NOTE - ATTENDING CONTRIBUTION TO CARE
28M no PMH p/w 1w of cough, rhinorrhea/congestion, subjective fever. Felt mild SOB this morning, now resolved. Has occasional post-tussive dry heaving, no vomiting. No nausea. Has minimal b/l abd pain only while coughing. Mild pain to b/l shoulders since yesterday. No bondy aches. Transient relief w/ 600mg ibuprofen, last taken ~0300. No other systemic symptoms.   No trauma. Denies HA, CP, NVD, urinary complaints, lightheaded, rashes. Normal appetite.   Tachycardic, febrile, other vitals wnl. Exam as above. Very well appearing.   ddx: Sepsis - likely 2/2 viral URI.  CXR, COVID/flu, tylenol/motrin, reassess.